# Patient Record
Sex: MALE | Race: WHITE | NOT HISPANIC OR LATINO | URBAN - METROPOLITAN AREA
[De-identification: names, ages, dates, MRNs, and addresses within clinical notes are randomized per-mention and may not be internally consistent; named-entity substitution may affect disease eponyms.]

---

## 2022-06-04 ENCOUNTER — TELEPHONE ENCOUNTER (OUTPATIENT)
Dept: URBAN - METROPOLITAN AREA CLINIC 68 | Facility: CLINIC | Age: 67
End: 2022-06-04

## 2022-06-04 RX ORDER — CLONAZEPAM 1 MG/1
CLONAZEPAM( 1MG ORAL  AS NEEDED ) INACTIVE -HX ENTRY TABLET, ORALLY DISINTEGRATING ORAL AS NEEDED
OUTPATIENT
Start: 2017-02-14

## 2022-06-05 ENCOUNTER — TELEPHONE ENCOUNTER (OUTPATIENT)
Dept: URBAN - METROPOLITAN AREA CLINIC 68 | Facility: CLINIC | Age: 67
End: 2022-06-05

## 2022-06-05 RX ORDER — INFLIXIMAB 100 MG/10ML
REMICADE( 100MG INTRAVENOUS  EVERY 5 TO 6 WEEKS ) ACTIVE -HX ENTRY INJECTION, POWDER, LYOPHILIZED, FOR SOLUTION INTRAVENOUS
Status: ACTIVE | COMMUNITY
Start: 2017-02-14

## 2022-06-05 RX ORDER — ATENOLOL 50 MG/1
ATENOLOL( 50MG ORAL  AS NEEDED ) ACTIVE -HX ENTRY TABLET ORAL AS NEEDED
Status: ACTIVE | COMMUNITY
Start: 2017-02-14

## 2022-06-05 RX ORDER — MELOXICAM 15 MG/1
MELOXICAM( 15MG ORAL  AS NEEDED ) ACTIVE -HX ENTRY TABLET ORAL AS NEEDED
Status: ACTIVE | COMMUNITY
Start: 2017-02-14

## 2022-06-25 ENCOUNTER — TELEPHONE ENCOUNTER (OUTPATIENT)
Age: 67
End: 2022-06-25

## 2022-06-25 RX ORDER — CLONAZEPAM 1 MG/1
CLONAZEPAM( 1MG ORAL  AS NEEDED ) INACTIVE -HX ENTRY TABLET, ORALLY DISINTEGRATING ORAL AS NEEDED
OUTPATIENT
Start: 2017-02-14

## 2022-06-26 ENCOUNTER — TELEPHONE ENCOUNTER (OUTPATIENT)
Age: 67
End: 2022-06-26

## 2022-06-26 RX ORDER — ATENOLOL 50 MG/1
ATENOLOL( 50MG ORAL  AS NEEDED ) ACTIVE -HX ENTRY TABLET ORAL AS NEEDED
Status: ACTIVE | COMMUNITY
Start: 2017-02-14

## 2022-06-26 RX ORDER — INFLIXIMAB 100 MG/10ML
REMICADE( 100MG INTRAVENOUS  EVERY 5 TO 6 WEEKS ) ACTIVE -HX ENTRY INJECTION, POWDER, LYOPHILIZED, FOR SOLUTION INTRAVENOUS
Status: ACTIVE | COMMUNITY
Start: 2017-02-14

## 2022-06-26 RX ORDER — MELOXICAM 15 MG/1
MELOXICAM( 15MG ORAL  AS NEEDED ) ACTIVE -HX ENTRY TABLET ORAL AS NEEDED
Status: ACTIVE | COMMUNITY
Start: 2017-02-14

## 2022-12-20 ENCOUNTER — OFFICE VISIT (OUTPATIENT)
Dept: URBAN - METROPOLITAN AREA CLINIC 66 | Facility: CLINIC | Age: 67
End: 2022-12-20

## 2022-12-20 RX ORDER — INFLIXIMAB 100 MG/10ML
REMICADE( 100MG INTRAVENOUS  EVERY 5 TO 6 WEEKS ) ACTIVE -HX ENTRY INJECTION, POWDER, LYOPHILIZED, FOR SOLUTION INTRAVENOUS
Status: ON HOLD | COMMUNITY
Start: 2017-02-14

## 2022-12-20 RX ORDER — ATENOLOL 50 MG/1
ATENOLOL( 50MG ORAL  AS NEEDED ) ACTIVE -HX ENTRY TABLET ORAL AS NEEDED
Status: ACTIVE | COMMUNITY
Start: 2017-02-14

## 2022-12-20 RX ORDER — MELOXICAM 15 MG/1
MELOXICAM( 15MG ORAL  AS NEEDED ) ACTIVE -HX ENTRY TABLET ORAL AS NEEDED
Status: ON HOLD | COMMUNITY
Start: 2017-02-14

## 2022-12-20 RX ORDER — SOD SULF/POT CHLORIDE/MAG SULF 1.479 G
AS DIRECTED TABLET ORAL ONCE
Qty: 1 PACKET | Refills: 0 | OUTPATIENT
Start: 2022-12-20

## 2022-12-20 RX ORDER — GOLIMUMAB 50 MG/4ML
AS DIRECTED SOLUTION INTRAVENOUS
Status: ACTIVE | COMMUNITY

## 2022-12-20 NOTE — HPI-MIGRATED HPI
Transition of Care : Patient evaluated due to hemochromatosis and colon polyps.  Denies nausea, vomits, dysphagia, odynophagia, heartburn, abdominal pain, diarrhea, constipation GI bleeding or weight loss

## 2023-01-11 LAB
% SATURATION: 93
ALBUMIN/GLOBULIN RATIO: 2
ALBUMIN: 4.3
ALKALINE PHOSPHATASE: 47
ALT: 33
AST: 23
BILIRUBIN, TOTAL: 0.9
BUN/CREATININE RATIO: (no result)
CALCIUM: 8.9
CARBON DIOXIDE: 28
CHLORIDE: 106
CREATININE: 0.82
EGFR: 97
FERRITIN: 254
GLOBULIN: 2.2
GLUCOSE: 79
HEMATOCRIT: 34.2
HEMOGLOBIN: 12
IRON BINDING CAPACITY: 320
IRON, TOTAL: 296
MCH: 35.8
MCHC: 35.1
MCV: 102.1
MPV: 10.1
PLATELET COUNT: 251
POTASSIUM: 4.3
PROTEIN, TOTAL: 6.5
RDW: 12.2
RED BLOOD CELL COUNT: 3.35
SODIUM: 139
UREA NITROGEN (BUN): 21
WHITE BLOOD CELL COUNT: 7.9

## 2023-01-20 ENCOUNTER — OFFICE VISIT (OUTPATIENT)
Dept: URBAN - METROPOLITAN AREA CLINIC 68 | Facility: CLINIC | Age: 68
End: 2023-01-20

## 2023-01-23 ENCOUNTER — TELEPHONE ENCOUNTER (OUTPATIENT)
Dept: URBAN - METROPOLITAN AREA CLINIC 68 | Facility: CLINIC | Age: 68
End: 2023-01-23

## 2023-01-24 ENCOUNTER — TELEPHONE ENCOUNTER (OUTPATIENT)
Dept: URBAN - METROPOLITAN AREA CLINIC 68 | Facility: CLINIC | Age: 68
End: 2023-01-24

## 2023-01-24 ENCOUNTER — OFFICE VISIT (OUTPATIENT)
Dept: URBAN - METROPOLITAN AREA CLINIC 66 | Facility: CLINIC | Age: 68
End: 2023-01-24

## 2023-01-24 RX ORDER — INFLIXIMAB 100 MG/10ML
REMICADE( 100MG INTRAVENOUS  EVERY 5 TO 6 WEEKS ) ACTIVE -HX ENTRY INJECTION, POWDER, LYOPHILIZED, FOR SOLUTION INTRAVENOUS
Status: ON HOLD | COMMUNITY
Start: 2017-02-14

## 2023-01-24 RX ORDER — GOLIMUMAB 50 MG/4ML
AS DIRECTED SOLUTION INTRAVENOUS
Status: ACTIVE | COMMUNITY

## 2023-01-24 RX ORDER — SOD SULF/POT CHLORIDE/MAG SULF 1.479 G
AS DIRECTED TABLET ORAL ONCE
Qty: 1 PACKET | Refills: 0 | Status: ACTIVE | COMMUNITY
Start: 2022-12-20

## 2023-01-24 RX ORDER — MELOXICAM 15 MG/1
MELOXICAM( 15MG ORAL  AS NEEDED ) ACTIVE -HX ENTRY TABLET ORAL AS NEEDED
Status: ON HOLD | COMMUNITY
Start: 2017-02-14

## 2023-01-24 RX ORDER — ATENOLOL 50 MG/1
ATENOLOL( 50MG ORAL  AS NEEDED ) ACTIVE -HX ENTRY TABLET ORAL AS NEEDED
Status: ACTIVE | COMMUNITY
Start: 2017-02-14

## 2023-01-24 NOTE — HPI-MIGRATED HPI
Transition of Care : Patient evaluated after having recent Fibroscan that showed moderate steatosis/no fibrosis.  Also had recent labs reported with iron sat of 93%.  Patient consented for video-audio encounter using [employer] Complaint gurinder. Denies nausea, vomits, dysphagia, odynophagia, heartburn, abdominal pain, diarrhea, constipation GI bleeding or weight loss

## 2023-02-08 ENCOUNTER — DASHBOARD ENCOUNTERS (OUTPATIENT)
Age: 68
End: 2023-02-08

## 2023-02-08 ENCOUNTER — OFFICE VISIT (OUTPATIENT)
Dept: URBAN - METROPOLITAN AREA SURGERY CENTER 12 | Facility: SURGERY CENTER | Age: 68
End: 2023-02-08

## 2023-02-08 RX ORDER — ATENOLOL 50 MG/1
ATENOLOL( 50MG ORAL  AS NEEDED ) ACTIVE -HX ENTRY TABLET ORAL AS NEEDED
Status: ACTIVE | COMMUNITY
Start: 2017-02-14

## 2023-02-08 RX ORDER — GOLIMUMAB 50 MG/4ML
AS DIRECTED SOLUTION INTRAVENOUS
Status: ACTIVE | COMMUNITY

## 2023-02-08 RX ORDER — SOD SULF/POT CHLORIDE/MAG SULF 1.479 G
AS DIRECTED TABLET ORAL ONCE
Qty: 1 PACKET | Refills: 0 | Status: ACTIVE | COMMUNITY
Start: 2022-12-20

## 2023-02-08 RX ORDER — MELOXICAM 15 MG/1
MELOXICAM( 15MG ORAL  AS NEEDED ) ACTIVE -HX ENTRY TABLET ORAL AS NEEDED
Status: ON HOLD | COMMUNITY
Start: 2017-02-14

## 2023-02-08 RX ORDER — INFLIXIMAB 100 MG/10ML
REMICADE( 100MG INTRAVENOUS  EVERY 5 TO 6 WEEKS ) ACTIVE -HX ENTRY INJECTION, POWDER, LYOPHILIZED, FOR SOLUTION INTRAVENOUS
Status: ON HOLD | COMMUNITY
Start: 2017-02-14